# Patient Record
Sex: MALE | Race: WHITE | NOT HISPANIC OR LATINO | Employment: FULL TIME | URBAN - NONMETROPOLITAN AREA
[De-identification: names, ages, dates, MRNs, and addresses within clinical notes are randomized per-mention and may not be internally consistent; named-entity substitution may affect disease eponyms.]

---

## 2018-06-17 ENCOUNTER — OFFICE VISIT (OUTPATIENT)
Dept: FAMILY MEDICINE | Facility: OTHER | Age: 38
End: 2018-06-17
Attending: FAMILY MEDICINE
Payer: COMMERCIAL

## 2018-06-17 VITALS
BODY MASS INDEX: 30.02 KG/M2 | HEART RATE: 75 BPM | HEIGHT: 74 IN | SYSTOLIC BLOOD PRESSURE: 110 MMHG | TEMPERATURE: 97.6 F | DIASTOLIC BLOOD PRESSURE: 72 MMHG | OXYGEN SATURATION: 97 % | WEIGHT: 233.9 LBS

## 2018-06-17 DIAGNOSIS — J40 BRONCHITIS: Primary | ICD-10-CM

## 2018-06-17 PROCEDURE — 99213 OFFICE O/P EST LOW 20 MIN: CPT | Performed by: FAMILY MEDICINE

## 2018-06-17 PROCEDURE — G0463 HOSPITAL OUTPT CLINIC VISIT: HCPCS

## 2018-06-17 RX ORDER — BUDESONIDE AND FORMOTEROL FUMARATE DIHYDRATE 160; 4.5 UG/1; UG/1
2 AEROSOL RESPIRATORY (INHALATION) 2 TIMES DAILY
Qty: 1 INHALER | Refills: 0 | Status: SHIPPED | OUTPATIENT
Start: 2018-06-17

## 2018-06-17 ASSESSMENT — ENCOUNTER SYMPTOMS: COUGH: 1

## 2018-06-17 NOTE — MR AVS SNAPSHOT
"              After Visit Summary   2018    Aquiles Magaña    MRN: [id number]           Patient Information     Date Of Birth          1980        Visit Information        Provider Department      2018 12:45 PM Slade Chowdary MD [facility]        Today's Diagnoses     Bronchitis    -  1       Follow-ups after your visit        Who to contact     If you have questions or need follow up information about today's clinic visit or your schedule please contact [facility] directly at [phone].  Normal or non-critical lab and imaging results will be communicated to you by [employer], letter or phone within 4 business days after the clinic has received the results. If you do not hear from us within 7 days, please contact the clinic through [employer] or phone. If you have a critical or abnormal lab result, we will notify you by phone as soon as possible.  Submit refill requests through [employer] or call your pharmacy and they will forward the refill request to us. Please allow 3 business days for your refill to be completed.          Additional Information About Your Visit        MyChart Information     [employer] lets you send messages to your doctor, view your test results, renew your prescriptions, schedule appointments and more. To sign up, go to [email or web address] . Click on \"Log in\" on the left side of the screen, which will take you to the Welcome page. Then click on \"Sign up Now\" on the right side of the page.     You will be asked to enter the access code listed below, as well as some personal information. Please follow the directions to create your username and password.     Your access code is: [email or web address]  Expires: 9/15/2018  1:11 PM     Your access code will  in 90 days. If you need help or a new code, please call your [facility] or [phone].        Care EveryWhere ID     This is your Care EveryWhere ID. This could be used by other " "organizations to access your [place] medical records  [id number]        Your Vitals Were     Pulse Temperature Height Pulse Oximetry BMI (Body Mass Index)       75 97.6  F (36.4  C) (Tympanic) 6' 2\" (1.88 m) 97% 30.03 kg/m2        Blood Pressure from Last 3 Encounters:   06/17/18 110/72   07/11/14 124/88    Weight from Last 3 Encounters:   06/17/18 233 lb 14.4 oz (106.1 kg)   07/11/14 256 lb 3.2 oz (116.2 kg)              Today, you had the following     No orders found for display         Today's Medication Changes          These changes are accurate as of 6/17/18  1:11 PM.  If you have any questions, ask your nurse or doctor.               Start taking these medicines.        Dose/Directions    budesonide-formoterol 160-4.5 MCG/ACT Inhaler   Commonly known as:  SYMBICORT   Used for:  Bronchitis   Started by:  Slade Chowdary MD        Dose:  2 puff   Inhale 2 puffs into the lungs 2 times daily   Quantity:  1 Inhaler   Refills:  0            Where to get your medicines      These medications were sent to [facility] [id number] - [place], [place] - [address]  [address] [facility] [address]     Phone:  [phone]     budesonide-formoterol 160-4.5 MCG/ACT Inhaler                Primary Care Provider Fax #    Physician No Ref-Primary [phone]       No address on file        Equal Access to Services     ALICJA BATES : Sharif haas Sohudson, waedwardda luqadaha, qaybta kaalmajenny naranjo. So [facility] [phone].    ATENCIÓN: Si habla zoila, tiene a jeong disposición servicios gratuitos de asistencia lingüística. Llame al [phone].    We comply with applicable federal civil rights laws and [place] laws. We do not discriminate on the basis of race, color, national origin, age, disability, sex, sexual orientation, or gender identity.            Thank you!     Thank you for choosing [facility] AND \[facility]\""  for your care. Our goal is " always to provide you with excellent care. Hearing back from our patients is one way we can continue to improve our services. Please take a few minutes to complete the written survey that you may receive in the mail after your visit with us. Thank you!             Your Updated Medication List - Protect others around you: Learn how to safely use, store and throw away your medicines at www.disposemymeds.org.          This list is accurate as of 6/17/18  1:11 PM.  Always use your most recent med list.                   Brand Name Dispense Instructions for use Diagnosis    budesonide-formoterol 160-4.5 MCG/ACT Inhaler    SYMBICORT    1 Inhaler    Inhale 2 puffs into the lungs 2 times daily    Bronchitis

## 2018-06-17 NOTE — NURSING NOTE
Patient present to clinic today with a cough for over a month. Was ill at first with a throat illness. This is gone now. Patient just can not get rid of the cough.   Ashley Garcia CMA..............6/17/2018........1:00 PM

## 2018-06-17 NOTE — PROGRESS NOTES
"  SUBJECTIVE:   Aquiles Magaña is a 37 year old male who presents to clinic today for the following health issues: Cough    HPI Comments: Patient arrives here for cough.  This been going on for about a month on and off.  Occasionally dry and sometimes productive.  Mom encouraged him to come in.  He has no history of allergies.  He tried Zyrtec on one occasion without any help.  No fevers or chills.  Warm air seems to make it worse nothing seems to make it better.  Is not continuous.  Seems to come and go.    Cough           There are no active problems to display for this patient.    History reviewed. No pertinent past medical history.   History reviewed. No pertinent surgical history.    Review of Systems   Respiratory: Positive for cough.         OBJECTIVE:     /72  Pulse 75  Temp 97.6  F (36.4  C) (Tympanic)  Ht 6' 2\" (1.88 m)  Wt 233 lb 14.4 oz (106.1 kg)  SpO2 97%  BMI 30.03 kg/m2  Body mass index is 30.03 kg/(m^2).  Physical Exam   Constitutional: He appears well-developed.   HENT:   Right Ear: External ear normal.   Left Ear: External ear normal.   Mouth/Throat: No oropharyngeal exudate.   Eyes: Pupils are equal, round, and reactive to light.   Cardiovascular: Normal rate and regular rhythm.    Pulmonary/Chest: Effort normal and breath sounds normal.   Neurological: He is alert.   Skin: Skin is warm.   Psychiatric: He has a normal mood and affect.       none     ASSESSMENT/PLAN:         1. Bronchitis  Possibly post viral or allergic cough.  Will try Symbicort.  Recommend giving it 1 week.  If not improving follow-up in clinic.  - budesonide-formoterol (SYMBICORT) 160-4.5 MCG/ACT Inhaler; Inhale 2 puffs into the lungs 2 times daily  Dispense: 1 Inhaler; Refill: 0      Slade Chowdary MD  [facility] AND hospitals  "

## 2020-11-05 ENCOUNTER — VIRTUAL VISIT (OUTPATIENT)
Dept: FAMILY MEDICINE | Facility: OTHER | Age: 40
End: 2020-11-05
Payer: COMMERCIAL

## 2020-11-05 VITALS — HEIGHT: 75 IN | BODY MASS INDEX: 27.35 KG/M2 | WEIGHT: 220 LBS | TEMPERATURE: 96 F

## 2020-11-05 DIAGNOSIS — Z20.822 EXPOSURE TO COVID-19 VIRUS: Primary | ICD-10-CM

## 2020-11-05 PROCEDURE — 99212 OFFICE O/P EST SF 10 MIN: CPT | Mod: 95 | Performed by: NURSE PRACTITIONER

## 2020-11-05 ASSESSMENT — PAIN SCALES - GENERAL: PAINLEVEL: NO PAIN (0)

## 2020-11-05 ASSESSMENT — MIFFLIN-ST. JEOR: SCORE: 1993.54

## 2020-11-05 NOTE — NURSING NOTE
"Chief Complaint   Patient presents with     COVID exposure     Patient states his girlfriends son tested positive for COVID. His last exposure to him was on 10/30/20. Patient states the son was having some symptoms at the time.     Initial Temp 96  F (35.6  C) (Oral)   Ht 1.905 m (6' 3\")   Wt 99.8 kg (220 lb)   BMI 27.50 kg/m   Estimated body mass index is 27.5 kg/m  as calculated from the following:    Height as of this encounter: 1.905 m (6' 3\").    Weight as of this encounter: 99.8 kg (220 lb).  Medication Reconciliation: complete    Carrie Mcintosh LPN  "

## 2020-11-05 NOTE — PROGRESS NOTES
"Aquiles Magaña is a 40 year old male who is being evaluated via a billable telephone visit.      The patient has been notified of following:     \"This telephone visit will be conducted via a call between you and your physician/provider. We have found that certain health care needs can be provided without the need for a physical exam.  This service lets us provide the care you need with a short phone conversation.  If a prescription is necessary we can send it directly to your pharmacy.  If lab work is needed we can place an order for that and you can then stop by our lab to have the test done at a later time.    Telephone visits are billed at different rates depending on your insurance coverage. During this emergency period, for some insurers they may be billed the same as an in-person visit.  Please reach out to your insurance provider with any questions.    If during the course of the call the physician/provider feels a telephone visit is not appropriate, you will not be charged for this service.\"    Patient has given verbal consent for Telephone visit?  Yes    What phone number would you like to be contacted at? [phone]    How would you like to obtain your AVS? Ashok    Subjective     Aquiles Magaña is a 40 year old male who presents via phone visit today for the following health issues:    HPI  The patient was exposed to his girlfriends son on 10/30/20 for around half an hour. The girlfriends son tested positive for COVID on 11/5/20. The patient states that his girlfriend is getting tested tomorrow 11/6/20. The patient states that he has been feeling fatigued for the last couple of days, otherwise no other symptoms.         Review of Systems   See HPI       Objective   Suspected COVID exposure:  Known COVID exposure: yes  Fevers or chills: No  Nasal congestion or drainage: No  Cough: No   Chest tightness or heaviness: No  Shortness of breath: No  Sore throat: No  Nausea or vomiting: No  Diarrhea: No  Loss of " taste or smell: No  Headaches: No  Body aches: No  Fatigue: yes  Previous covid test: no      Vitals:  No vitals were obtained today due to virtual visit.    healthy, alert and no distress  PSYCH: Alert and oriented times 3; coherent speech, normal   rate and volume, able to articulate logical thoughts, able   to abstract reason, no tangential thoughts, no hallucinations   or delusions  His affect is normal  RESP: No cough, no audible wheezing, able to talk in full sentences  Remainder of exam unable to be completed due to telephone visits          Assessment/Plan:    Assessment & Plan   Problem List Items Addressed This Visit     None      Visit Diagnoses     Exposure to COVID-19 virus    -  Primary    Relevant Orders    Asymptomatic COVID-19 Virus (Coronavirus) by PCR           Come to clinic for [facility] COVID-19 test, phone number provided and process discussed.    Self quarantine until test results are available and continue if positive.    Instructed to limit movements outside of home as much as possible, social distance, mask in public spaces, and monitor closely for COVID-19 symptoms      Discussed warning signs/symptoms indicative of need to f/u  Come to be seen in clinic if worsening or concerns           No follow-ups on file.    Chantal Bowers NP  [facility]    Phone call duration:  5 minutes

## 2020-11-06 ENCOUNTER — ALLIED HEALTH/NURSE VISIT (OUTPATIENT)
Dept: FAMILY MEDICINE | Facility: OTHER | Age: 40
End: 2020-11-06
Attending: NURSE PRACTITIONER
Payer: COMMERCIAL

## 2020-11-06 DIAGNOSIS — Z20.822 EXPOSURE TO COVID-19 VIRUS: Primary | ICD-10-CM

## 2020-11-06 PROCEDURE — U0003 INFECTIOUS AGENT DETECTION BY NUCLEIC ACID (DNA OR RNA); SEVERE ACUTE RESPIRATORY SYNDROME CORONAVIRUS 2 (SARS-COV-2) (CORONAVIRUS DISEASE [COVID-19]), AMPLIFIED PROBE TECHNIQUE, MAKING USE OF HIGH THROUGHPUT TECHNOLOGIES AS DESCRIBED BY CMS-2020-01-R: HCPCS | Mod: ZL | Performed by: NURSE PRACTITIONER

## 2020-11-06 PROCEDURE — C9803 HOPD COVID-19 SPEC COLLECT: HCPCS

## 2020-11-06 PROCEDURE — 99207 PR NO CHARGE NURSE ONLY: CPT

## 2020-11-08 LAB
SARS-COV-2 RNA SPEC QL NAA+PROBE: ABNORMAL
SPECIMEN SOURCE: ABNORMAL

## 2020-12-27 ENCOUNTER — HEALTH MAINTENANCE LETTER (OUTPATIENT)
Age: 40
End: 2020-12-27

## 2020-12-28 VITALS
SYSTOLIC BLOOD PRESSURE: 148 MMHG | HEIGHT: 74 IN | TEMPERATURE: 97.3 F | BODY MASS INDEX: 29.52 KG/M2 | DIASTOLIC BLOOD PRESSURE: 84 MMHG | OXYGEN SATURATION: 99 % | HEART RATE: 70 BPM | RESPIRATION RATE: 16 BRPM | WEIGHT: 230 LBS

## 2020-12-28 PROCEDURE — 99283 EMERGENCY DEPT VISIT LOW MDM: CPT | Mod: 25 | Performed by: STUDENT IN AN ORGANIZED HEALTH CARE EDUCATION/TRAINING PROGRAM

## 2020-12-28 PROCEDURE — 12001 RPR S/N/AX/GEN/TRNK 2.5CM/<: CPT | Performed by: STUDENT IN AN ORGANIZED HEALTH CARE EDUCATION/TRAINING PROGRAM

## 2020-12-28 ASSESSMENT — MIFFLIN-ST. JEOR: SCORE: 2023.02

## 2020-12-29 ENCOUNTER — HOSPITAL ENCOUNTER (EMERGENCY)
Facility: OTHER | Age: 40
Discharge: HOME | End: 2020-12-29
Attending: STUDENT IN AN ORGANIZED HEALTH CARE EDUCATION/TRAINING PROGRAM | Admitting: STUDENT IN AN ORGANIZED HEALTH CARE EDUCATION/TRAINING PROGRAM
Payer: COMMERCIAL

## 2020-12-29 DIAGNOSIS — S61.216A LACERATION OF RIGHT LITTLE FINGER WITHOUT FOREIGN BODY WITHOUT DAMAGE TO NAIL, INITIAL ENCOUNTER: ICD-10-CM

## 2020-12-29 PROCEDURE — 12001 RPR S/N/AX/GEN/TRNK 2.5CM/<: CPT | Performed by: STUDENT IN AN ORGANIZED HEALTH CARE EDUCATION/TRAINING PROGRAM

## 2020-12-29 PROCEDURE — 99283 EMERGENCY DEPT VISIT LOW MDM: CPT | Mod: 25 | Performed by: STUDENT IN AN ORGANIZED HEALTH CARE EDUCATION/TRAINING PROGRAM

## 2020-12-29 PROCEDURE — 250N000009 HC RX 250: Performed by: STUDENT IN AN ORGANIZED HEALTH CARE EDUCATION/TRAINING PROGRAM

## 2020-12-29 PROCEDURE — 99282 EMERGENCY DEPT VISIT SF MDM: CPT | Mod: 25 | Performed by: STUDENT IN AN ORGANIZED HEALTH CARE EDUCATION/TRAINING PROGRAM

## 2020-12-29 RX ORDER — GINSENG 100 MG
CAPSULE ORAL ONCE
Status: COMPLETED | OUTPATIENT
Start: 2020-12-29 | End: 2020-12-29

## 2020-12-29 RX ADMIN — BACITRACIN: 500 OINTMENT TOPICAL at 01:47

## 2020-12-29 RX ADMIN — LIDOCAINE HYDROCHLORIDE 5 ML: 10 INJECTION, SOLUTION EPIDURAL; INFILTRATION; INTRACAUDAL; PERINEURAL at 01:48

## 2020-12-29 NOTE — ED AVS SNAPSHOT
[facility]  [address]  [place] [address]  Phone: [phone]  Fax: [phone]                                    Aquiles Magaña   MRN: [id number]    Department: [facility]   Date of Visit: 12/28/2020           After Visit Summary Signature Page    I have received my discharge instructions, and my questions have been answered. I have discussed any challenges I see with this plan with the nurse or doctor.    ..........................................................................................................................................  Patient/Patient Representative Signature      ..........................................................................................................................................  Patient Representative Print Name and Relationship to Patient    ..................................................               ................................................  Date                                   Time    ..........................................................................................................................................  Reviewed by Signature/Title    ...................................................              ..............................................  Date                                               Time          22EPIC Rev 08/18

## 2020-12-29 NOTE — ED TRIAGE NOTES
Pt comes into the ER today with a laceration between his right 4th and 5th fingers. Bleeding controlled. Cut on glass while doing dishes.

## 2020-12-29 NOTE — ED PROVIDER NOTES
"  History     Chief Complaint   Patient presents with     Laceration       HPI     Aquiles Magaña is a 40 year old male presenting with finger laceration. This was sustained several hours ago. The mechanism of injury was cutting the medial base of his right 5th finger with clean glass during dishwashing. The patient has low suspicion of foreign body in the wound. Tetanus is up to date (2016). There is no numbness, tingling, weakness.     No Known Allergies    There are no active problems to display for this patient.      History reviewed. No pertinent past medical history.    History reviewed. No pertinent surgical history.    No family history on file.    Social History     Tobacco Use     Smoking status: Never Smoker     Smokeless tobacco: Never Used   Substance Use Topics     Alcohol use: No     Drug use: No     Comment: Drug use: Not Asked       Medications:         budesonide-formoterol (SYMBICORT) 160-4.5 MCG/ACT Inhaler        Review of Systems: See HPI for pertinent negative and positive findings.    Physical Exam   BP (!) 148/84   Pulse 70   Temp 97.3  F (36.3  C) (Tympanic)   Resp 16   Ht 1.88 m (6' 2\")   Wt 104.3 kg (230 lb)   SpO2 99%   BMI 29.53 kg/m       General: awake, comfortable  HEENT: atraumatic  Respiratory: normal effort  Cardiovascular: symmetric distal CR  Extremities: no deformities, edema, or tenderness  MSK: flexion/extension/abduction/adduction of right 5th finger 5/5  Skin: 1.5 cm straight clean laceration to the medial 5th finger base, explored to depth without sign of foreign body  Neuro: alert, distal sensation intact    ED Course           No results found for this or any previous visit (from the past 24 hour(s)).    Medications   lidocaine 1 % 5 mL (5 mLs Subcutaneous Given by Other Clinician 12/29/20 0148)   bacitracin ointment ( Topical Given 12/29/20 0147)       Laceration Repair Procedure Note  Wound Site - right 5th finger base  Length in cms - 1.5 cm  Sensory - Intact to " light touch  Motor - Intact  Tendon Injury - No  Anesthetic - 3 ml of lidocaine without epinephrine  Irrigation - Performed with normal saline  Debridement - None  Suture - 5-0 monofilament  Number of sutures - 2, horizontal mattress   Complications - None, the patient tolerated this repair well with no complications  This was Simple laceration repair.   SR 7-10 days    Assessments & Plan (with Medical Decision Making)     I have reviewed the nursing notes.    Finger laceration. Laceration explored to its full depth and there is low suspicion of significant injury to underlying soft tissue or bony structures. CMS intact. Based on history and exam, an x-ray was not indicated to evaluate for retained foreign body.  Tetanus did not require updating. Laceration repaired per above. Wound care instructions, including use of antibiotic ointment, cleaning with soap and water, avoidance of prolonged submersion or dirty environments, and suture removal provided.     I have reviewed the findings, diagnosis, plan and need for follow up with the patient.    Discharge Medication List as of 12/29/2020  1:47 AM          Final diagnoses:   Laceration of right little finger without foreign body without damage to nail, initial encounter       12/29/2020   [facility] \[facility]\""       Karl Salvador MD  12/29/20 0157

## 2020-12-29 NOTE — DISCHARGE INSTRUCTIONS
Keep wound dry for 24 hours and then wash normally with soap and water. Dry gently and avoid disrupting sutures. Avoid dirty environments. Apply bacitracin (topical antibiotic) to wound with each dressing change for first 3-5 days. Examine for signs of infection (Discharge, Redness, worsening Inflammation or Pain) daily. Get sutures removed in 7-10 days. Alternate tylenol (can take up to 3000 mg in 24 hour period) and ibuprofen (can take up to 2400 mg in 24 hour period) for pain control as needed. Apply ice regularly over next 2 days to help reduce swelling as needed. Wounds can take up to 1 year to reach their final long-term appearance.

## 2021-10-09 ENCOUNTER — HEALTH MAINTENANCE LETTER (OUTPATIENT)
Age: 41
End: 2021-10-09

## 2022-01-29 ENCOUNTER — HEALTH MAINTENANCE LETTER (OUTPATIENT)
Age: 42
End: 2022-01-29

## 2022-09-17 ENCOUNTER — HEALTH MAINTENANCE LETTER (OUTPATIENT)
Age: 42
End: 2022-09-17

## 2023-05-06 ENCOUNTER — HEALTH MAINTENANCE LETTER (OUTPATIENT)
Age: 43
End: 2023-05-06

## 2023-09-14 ENCOUNTER — HOSPITAL ENCOUNTER (EMERGENCY)
Facility: OTHER | Age: 43
Discharge: HOME | End: 2023-09-14
Attending: FAMILY MEDICINE | Admitting: FAMILY MEDICINE
Payer: COMMERCIAL

## 2023-09-14 VITALS
TEMPERATURE: 97.2 F | BODY MASS INDEX: 32.83 KG/M2 | SYSTOLIC BLOOD PRESSURE: 134 MMHG | HEART RATE: 82 BPM | OXYGEN SATURATION: 96 % | HEIGHT: 74 IN | RESPIRATION RATE: 17 BRPM | DIASTOLIC BLOOD PRESSURE: 89 MMHG | WEIGHT: 255.8 LBS

## 2023-09-14 DIAGNOSIS — S31.31XA LACERATION OF SCROTUM, INITIAL ENCOUNTER: ICD-10-CM

## 2023-09-14 PROCEDURE — 99282 EMERGENCY DEPT VISIT SF MDM: CPT | Performed by: FAMILY MEDICINE

## 2023-09-14 PROCEDURE — 99282 EMERGENCY DEPT VISIT SF MDM: CPT

## 2023-09-15 NOTE — PROGRESS NOTES
Patient waiting in waiting room prior to departure. Staff told patient to wait for AVS paperwork before departing.

## 2023-09-15 NOTE — ED TRIAGE NOTES
"Pt states she was showering and \"grooming himself\" and cut his scrotum in the middle. Pt not sure if it is actively bleeding but did place a tissue in underwear.  Pt just wants to get the lac checked out as he reports sometimes the male wlll get an erection in their sleep and didn't want anything to happen.  Last tetanus was 2016.     Triage Assessment       Row Name 09/14/23 8229       Triage Assessment (Adult)    Airway WDL WDL       Respiratory WDL    Respiratory WDL WDL       Cardiac WDL    Cardiac WDL WDL       Peripheral/Neurovascular WDL    Peripheral Neurovascular WDL WDL       Cognitive/Neuro/Behavioral WDL    Cognitive/Neuro/Behavioral WDL WDL                    "

## 2023-09-15 NOTE — ED PROVIDER NOTES
"  History     Chief Complaint   Patient presents with    Scrotum Laceration      HPI  Aquiles Magaña is a 43 year old male who presents for small laceration to the scrotal area after shaving tonight.  There is a small amount of bleeding.  He just went to be evaluated make sure everything was okay.  He denies concern for STD.    Allergies:  No Known Allergies    Problem List:    There are no problems to display for this patient.       Past Medical History:    No past medical history on file.    Past Surgical History:    No past surgical history on file.    Family History:    No family history on file.    Social History:  Marital Status:  Single [1]  Social History     Tobacco Use    Smoking status: Never    Smokeless tobacco: Never   Substance Use Topics    Alcohol use: No    Drug use: No     Comment: Drug use: Not Asked        Medications:    budesonide-formoterol (SYMBICORT) 160-4.5 MCG/ACT Inhaler          Review of Systems   Genitourinary:         Laceration to scrotal skin       Physical Exam   BP: 134/89  Pulse: 82  Temp: 97.2  F (36.2  C)  Resp: 17  Height: 188 cm (6' 2\")  Weight: 116 kg (255 lb 12.8 oz)  SpO2: 96 %      Physical Exam  Vitals and nursing note reviewed. Exam conducted with a chaperone present.   Constitutional:       Appearance: Normal appearance.   Genitourinary:     Penis: Normal and circumcised.       Comments: Very small laceration on the underside of the scrotal skin.  Steri-Strips placed.  Neurological:      Mental Status: He is alert.         ED Course                 Procedures              Critical Care time:  none               No results found for this or any previous visit (from the past 24 hour(s)).    Medications - No data to display    Assessments & Plan (with Medical Decision Making)     I have reviewed the nursing notes.    I have reviewed the findings, diagnosis, plan and need for follow up with the patient.           Medical Decision Making  The patient's presentation was of " straightforward complexity (a clearly self-limited or minor problem).    The patient's evaluation involved:  history and exam without other MDM data elements    The patient's management necessitated only low risk treatment.        Discharge Medication List as of 9/14/2023 11:00 PM          Final diagnoses:   Laceration of scrotum, initial encounter   Laceration a very small.  May be 5 mm in length total.  Steri-Strips applied.  There is no surrounding erythema or edema.  This happened earlier tonight.  Discussed avoiding dirty water pools hot tubs lake etc. until the area is healed.  Return if any concerns related to infection.    9/14/2023   [facility]       Kelsie Wilks DO  09/14/23 0672

## 2024-05-04 ENCOUNTER — HEALTH MAINTENANCE LETTER (OUTPATIENT)
Age: 44
End: 2024-05-04

## 2025-03-25 ENCOUNTER — HOSPITAL ENCOUNTER (EMERGENCY)
Facility: OTHER | Age: 45
Discharge: HOME | End: 2025-03-25
Attending: STUDENT IN AN ORGANIZED HEALTH CARE EDUCATION/TRAINING PROGRAM
Payer: COMMERCIAL

## 2025-03-25 DIAGNOSIS — S61.311A LACERATION OF LEFT INDEX FINGER WITHOUT FOREIGN BODY WITH DAMAGE TO NAIL, INITIAL ENCOUNTER: ICD-10-CM

## 2025-03-25 PROCEDURE — 99282 EMERGENCY DEPT VISIT SF MDM: CPT | Performed by: STUDENT IN AN ORGANIZED HEALTH CARE EDUCATION/TRAINING PROGRAM

## 2025-03-25 PROCEDURE — 99282 EMERGENCY DEPT VISIT SF MDM: CPT | Mod: 25 | Performed by: STUDENT IN AN ORGANIZED HEALTH CARE EDUCATION/TRAINING PROGRAM

## 2025-03-25 PROCEDURE — 12001 RPR S/N/AX/GEN/TRNK 2.5CM/<: CPT | Performed by: STUDENT IN AN ORGANIZED HEALTH CARE EDUCATION/TRAINING PROGRAM

## 2025-03-25 PROCEDURE — 90715 TDAP VACCINE 7 YRS/> IM: CPT | Performed by: STUDENT IN AN ORGANIZED HEALTH CARE EDUCATION/TRAINING PROGRAM

## 2025-03-25 PROCEDURE — 90471 IMMUNIZATION ADMIN: CPT | Performed by: STUDENT IN AN ORGANIZED HEALTH CARE EDUCATION/TRAINING PROGRAM

## 2025-03-25 PROCEDURE — 99283 EMERGENCY DEPT VISIT LOW MDM: CPT | Performed by: STUDENT IN AN ORGANIZED HEALTH CARE EDUCATION/TRAINING PROGRAM

## 2025-03-25 PROCEDURE — 250N000011 HC RX IP 250 OP 636: Performed by: STUDENT IN AN ORGANIZED HEALTH CARE EDUCATION/TRAINING PROGRAM

## 2025-03-25 RX ADMIN — CLOSTRIDIUM TETANI TOXOID ANTIGEN (FORMALDEHYDE INACTIVATED), CORYNEBACTERIUM DIPHTHERIAE TOXOID ANTIGEN (FORMALDEHYDE INACTIVATED), BORDETELLA PERTUSSIS TOXOID ANTIGEN (GLUTARALDEHYDE INACTIVATED), BORDETELLA PERTUSSIS FILAMENTOUS HEMAGGLUTININ ANTIGEN (FORMALDEHYDE INACTIVATED), BORDETELLA PERTUSSIS PERTACTIN ANTIGEN, AND BORDETELLA PERTUSSIS FIMBRIAE 2/3 ANTIGEN 0.5 ML: 5; 2; 2.5; 5; 3; 5 INJECTION, SUSPENSION INTRAMUSCULAR at 18:04

## 2025-03-25 ASSESSMENT — COLUMBIA-SUICIDE SEVERITY RATING SCALE - C-SSRS
2. HAVE YOU ACTUALLY HAD ANY THOUGHTS OF KILLING YOURSELF IN THE PAST MONTH?: NO
6. HAVE YOU EVER DONE ANYTHING, STARTED TO DO ANYTHING, OR PREPARED TO DO ANYTHING TO END YOUR LIFE?: NO
1. IN THE PAST MONTH, HAVE YOU WISHED YOU WERE DEAD OR WISHED YOU COULD GO TO SLEEP AND NOT WAKE UP?: NO

## 2025-03-25 ASSESSMENT — ACTIVITIES OF DAILY LIVING (ADL): ADLS_ACUITY_SCORE: 41

## 2025-03-25 NOTE — ED PROVIDER NOTES
History     Chief Complaint   Patient presents with    Laceration       Aquiles Magaña is a 44 year old male presenting with left finger laceration. Sustained today. Mechanism of injury was cutting his left index finger with knife while trying to cut through plastic wiring harness. Patient has low suspicion of foreign body in the wound. Tetanus 9 years ago. No numbness, tingling, weakness. Superficial cleaning at home prior to arrival.     No Known Allergies    There are no active problems to display for this patient.      History reviewed. No pertinent past medical history.    History reviewed. No pertinent surgical history.    History reviewed. No pertinent family history.    Social History     Tobacco Use    Smoking status: Never    Smokeless tobacco: Never   Substance Use Topics    Alcohol use: No    Drug use: No     Comment: Drug use: Not Asked       Medications:    budesonide-formoterol (SYMBICORT) 160-4.5 MCG/ACT Inhaler        Review of Systems: See HPI for pertinent negatives and positives. All other systems reviewed and found to be negative.    Physical Exam   There were no vitals taken for this visit.     General: awake, comfortable  HEENT: atraumatic  Respiratory: normal effort  Cardiovascular: left index finger CR<2s  Extremities: no deformities, edema, or tenderness  MSK: flexion/extension of left index finger normal  Skin: curved approximately 7 mm with well opposed edges over lateral distal finger over lateral distal nail bed right at distal nail fold end, no subungual hematoma, explored to depth without sign of foreign body  Neuro: alert, distal sensation intact    ED Course           No results found for this or any previous visit (from the past 24 hours).    Medications   Tdap (tetanus-diphtheria-acell pertussis) (ADACEL) injection 0.5 mL (0.5 mLs Intramuscular $Given 3/25/25 1387)       Procedures    Assessments & Plan (with Medical Decision Making)     I have reviewed the nursing notes.    Left  index finger laceration involving small portion of finger nail. No subungual hematoma requiring drainage. No significant nail fold involvement requiring nail removal. No pain to suspect fracture. Area was cleaned and then glued.  Tetanus updated.  Discharged home with below plan.    I have reviewed the findings, diagnosis, plan and need for follow up with the patient.    Patient instructions:   Keep wound dry for 24 hours and then avoid any submersion for next 4 days. Let glue fall off on its own. Avoid dirty environments. Examine for signs of infection (white/green/yellow discharge, worsening redness, inflammation or pain) daily and return to emergency department if any of these are present.     New Prescriptions    No medications on file       Final diagnoses:   Laceration of left index finger without foreign body with damage to nail, initial encounter       3/25/2025   [facility] \[facility]\""       Karl Salvador MD  03/25/25 1818

## 2025-03-25 NOTE — ED NOTES
Pts wound was cleaned well with warm soap and water. Applied derma bond to wound, wound is now well approximated.

## 2025-03-25 NOTE — ED TRIAGE NOTES
Pt comes in after cutting his finger with a pocket knife whole working on a car. He cut his L 2nd finger. The laceration goes in to the nail bed. TDAP updated in 2016.      Triage Assessment (Adult)       Row Name 03/25/25 1747          Triage Assessment    Airway WDL WDL        Respiratory WDL    Respiratory WDL WDL        Skin Circulation/Temperature WDL    Skin Circulation/Temperature WDL X        Cardiac WDL    Cardiac WDL WDL        Peripheral/Neurovascular WDL    Peripheral Neurovascular WDL WDL        Cognitive/Neuro/Behavioral WDL    Cognitive/Neuro/Behavioral WDL WDL

## 2025-03-25 NOTE — DISCHARGE INSTRUCTIONS
Keep wound dry for 24 hours and then avoid any submersion for next 4 days. Let glue fall off on its own. Avoid dirty environments. Examine for signs of infection (white/green/yellow discharge, worsening redness, inflammation or pain) daily and return to emergency department if any of these are present.

## (undated) RX ORDER — GINSENG 100 MG
CAPSULE ORAL
Status: DISPENSED
Start: 2020-12-29

## (undated) RX ORDER — LIDOCAINE HYDROCHLORIDE 10 MG/ML
INJECTION, SOLUTION EPIDURAL; INFILTRATION; INTRACAUDAL; PERINEURAL
Status: DISPENSED
Start: 2020-12-29